# Patient Record
Sex: FEMALE | Race: WHITE
[De-identification: names, ages, dates, MRNs, and addresses within clinical notes are randomized per-mention and may not be internally consistent; named-entity substitution may affect disease eponyms.]

---

## 2021-01-22 ENCOUNTER — HOSPITAL ENCOUNTER (EMERGENCY)
Dept: HOSPITAL 76 - ED | Age: 31
Discharge: HOME | End: 2021-01-22
Payer: MEDICAID

## 2021-01-22 VITALS — DIASTOLIC BLOOD PRESSURE: 82 MMHG | SYSTOLIC BLOOD PRESSURE: 112 MMHG

## 2021-01-22 DIAGNOSIS — N76.0: Primary | ICD-10-CM

## 2021-01-22 DIAGNOSIS — L29.9: ICD-10-CM

## 2021-01-22 LAB
ALBUMIN DIAFP-MCNC: 4.5 G/DL (ref 3.2–5.5)
ALBUMIN/GLOB SERPL: 1.2 {RATIO} (ref 1–2.2)
ALP SERPL-CCNC: 55 IU/L (ref 42–121)
ALT SERPL W P-5'-P-CCNC: 72 IU/L (ref 10–60)
ANION GAP SERPL CALCULATED.4IONS-SCNC: 9 MMOL/L (ref 6–13)
AST SERPL W P-5'-P-CCNC: 48 IU/L (ref 10–42)
BASOPHILS NFR BLD AUTO: 0 10^3/UL (ref 0–0.1)
BASOPHILS NFR BLD AUTO: 0.5 %
BILIRUB BLD-MCNC: 0.8 MG/DL (ref 0.2–1)
BUN SERPL-MCNC: 16 MG/DL (ref 6–20)
C KRUSEI DNA VAG QL NAA+PROBE: NEGATIVE
CALCIUM UR-MCNC: 9.4 MG/DL (ref 8.5–10.3)
CANDIDA DNA VAG QL NAA+PROBE: NEGATIVE
CHLORIDE SERPL-SCNC: 101 MMOL/L (ref 101–111)
CLARITY UR REFRACT.AUTO: CLEAR
CO2 SERPL-SCNC: 25 MMOL/L (ref 21–32)
CREAT SERPLBLD-SCNC: 0.6 MG/DL (ref 0.4–1)
EOSINOPHIL # BLD AUTO: 0.5 10^3/UL (ref 0–0.7)
EOSINOPHIL NFR BLD AUTO: 9.1 %
ERYTHROCYTE [DISTWIDTH] IN BLOOD BY AUTOMATED COUNT: 12.6 % (ref 12–15)
GLOBULIN SER-MCNC: 3.8 G/DL (ref 2.1–4.2)
GLUCOSE SERPL-MCNC: 94 MG/DL (ref 70–100)
GLUCOSE UR QL STRIP.AUTO: NEGATIVE MG/DL
HCG UR QL: NEGATIVE
HGB UR QL STRIP: 14.3 G/DL (ref 12–16)
KETONES UR QL STRIP.AUTO: NEGATIVE MG/DL
LIPASE SERPL-CCNC: 26 U/L (ref 22–51)
LYMPHOCYTES # SPEC AUTO: 1.9 10^3/UL (ref 1.5–3.5)
LYMPHOCYTES NFR BLD AUTO: 32.5 %
MCH RBC QN AUTO: 29.5 PG (ref 27–31)
MCHC RBC AUTO-ENTMCNC: 34.1 G/DL (ref 32–36)
MCV RBC AUTO: 86.6 FL (ref 81–99)
MONOCYTES # BLD AUTO: 0.4 10^3/UL (ref 0–1)
MONOCYTES NFR BLD AUTO: 6.4 %
NEUTROPHILS # BLD AUTO: 3.1 10^3/UL (ref 1.5–6.6)
NEUTROPHILS # SNV AUTO: 5.9 X10^3/UL (ref 4.8–10.8)
NEUTROPHILS NFR BLD AUTO: 51.5 %
NITRITE UR QL STRIP.AUTO: NEGATIVE
PDW BLD AUTO: 9.1 FL (ref 7.9–10.8)
PH UR STRIP.AUTO: 6 PH (ref 5–7.5)
PLATELET # BLD: 159 10^3/UL (ref 130–450)
PROT SPEC-MCNC: 8.3 G/DL (ref 6.7–8.2)
PROT UR STRIP.AUTO-MCNC: NEGATIVE MG/DL
RBC # UR STRIP.AUTO: NEGATIVE /UL
RBC MAR: 4.84 10^6/UL (ref 4.2–5.4)
SP GR UR STRIP.AUTO: >=1.03 (ref 1–1.03)
T VAGINALIS RRNA GENITAL QL PROBE: NEGATIVE
T VAGINALIS RRNA GENITAL QL PROBE: NEGATIVE
UROBILINOGEN UR QL STRIP.AUTO: (no result) E.U./DL
UROBILINOGEN UR STRIP.AUTO-MCNC: NEGATIVE MG/DL

## 2021-01-22 PROCEDURE — 80053 COMPREHEN METABOLIC PANEL: CPT

## 2021-01-22 PROCEDURE — 87481 CANDIDA DNA AMP PROBE: CPT

## 2021-01-22 PROCEDURE — 85025 COMPLETE CBC W/AUTO DIFF WBC: CPT

## 2021-01-22 PROCEDURE — 99283 EMERGENCY DEPT VISIT LOW MDM: CPT

## 2021-01-22 PROCEDURE — 36415 COLL VENOUS BLD VENIPUNCTURE: CPT

## 2021-01-22 PROCEDURE — 87801 DETECT AGNT MULT DNA AMPLI: CPT

## 2021-01-22 PROCEDURE — 87591 N.GONORRHOEAE DNA AMP PROB: CPT

## 2021-01-22 PROCEDURE — 83690 ASSAY OF LIPASE: CPT

## 2021-01-22 PROCEDURE — 81001 URINALYSIS AUTO W/SCOPE: CPT

## 2021-01-22 PROCEDURE — 81003 URINALYSIS AUTO W/O SCOPE: CPT

## 2021-01-22 PROCEDURE — 87491 CHLMYD TRACH DNA AMP PROBE: CPT

## 2021-01-22 PROCEDURE — 81025 URINE PREGNANCY TEST: CPT

## 2021-01-22 PROCEDURE — 87086 URINE CULTURE/COLONY COUNT: CPT

## 2021-01-22 PROCEDURE — 87661 TRICHOMONAS VAGINALIS AMPLIF: CPT

## 2021-01-22 NOTE — ED PHYSICIAN DOCUMENTATION
PD HPI FEMALE 





- Stated complaint


Stated Complaint: FEMALE 





- Chief complaint


Chief Complaint: Abd Pain





- History obtained from


History obtained from: Patient





- History of Present Illness


Timing - onset: How many days ago (2)


Timing - duration: Days (2)


Timing - details: Gradual onset, Still present


Associated symptoms: Abdominal pain, Pelvic pain, Vaginal discharge


Contributing factors: Sexually active.  No: Pregnant, Exposed to STD


Similar symptoms before: Has not had sx before


Recently seen: Clinic (drug treatment clinic and started Suboxone and Zoloft 

about a month ago.)





Review of Systems


Constitutional: denies: Fever, Chills


Throat: denies: Sore throat


Respiratory: denies: Dyspnea, Cough


GI: reports: Abdominal Pain.  denies: Nausea, Vomiting, Diarrhea


: reports: Discharge.  denies: Dysuria, Frequency


Skin: reports: Other (she is having some areas of itching on arms and legs, and 

some spotty rash today. Had not taken any meds prior to the onset of rash.)





PD PAST MEDICAL HISTORY





- Past Medical History


Cardiovascular: None


Respiratory: None


Neuro: None


Endocrine/Autoimmune: None





- Present Medications


Home Medications: 


                                Ambulatory Orders











 Medication  Instructions  Recorded  Confirmed


 


Buprenorphine HCl/Naloxone HCl 1 each PO BID 01/22/21 01/22/21





[Suboxone 8-2 mg Sl tab]   


 


Cetirizine [ZyrTEC] 10 mg PO BID #15 tablet 01/22/21 


 


Fluconazole [Diflucan] 150 mg PO ONCE #1 tablet 01/22/21 


 


Ibuprofen [Motrin] 600 mg PO TID PRN #25 tab 01/22/21 


 


Sertraline HCl [Zoloft] 100 mg PO DAILY 01/22/21 01/22/21


 


dexAMETHasone [Decadron] 4 mg PO DAILY #5 tablet 01/22/21 














- Allergies


Allergies/Adverse Reactions: 


                                    Allergies











Allergy/AdvReac Type Severity Reaction Status Date / Time


 


No Known Drug Allergies Allergy   Verified 01/22/21 15:15














- Social History


Does the pt smoke?: No


Smoking Status: Never smoker





PD ED PE NORMAL





- Vitals


Vital signs reviewed: Yes





- General


General: Alert and oriented X 3, No acute distress, Well developed/nourished





- HEENT


HEENT: Moist mucous membranes, Pharynx benign





- Neck


Neck: Supple, no meningeal sign, No adenopathy





- Cardiac


Cardiac: RRR, No murmur





- Respiratory


Respiratory: Clear bilaterally





- Abdomen


Abdomen: Soft, Non distended, Other (mild tender suprapubic area without 

guarding. )





- Female 


Female : Deferred, Other (she obtained self swabs for STD and Vaginitis. )





- Rectal


Rectal: Deferred





- Back


Back: No CVA TTP





- Derm


Derm: Normal color, Warm and dry, Other (several spotty mac/pap rash spots trunk

 and arms (legs with leggings on). ANd a hive patch appearing area left upper 

arm. )





Results





- Vitals


Vitals: 


                               Vital Signs - 24 hr











  01/22/21 01/22/21





  15:16 17:23


 


Temperature 36.9 C 36.9 C


 


Heart Rate 62 65


 


Respiratory 19 16





Rate  


 


Blood Pressure 120/79 112/82 H


 


O2 Saturation 100 97








                                     Oxygen











O2 Source                      Room air

















- Labs


Labs: 


                                Laboratory Tests











  01/22/21 01/22/21 01/22/21





  15:35 15:35 15:50


 


WBC    5.9


 


RBC    4.84


 


Hgb    14.3


 


Hct    41.9


 


MCV    86.6


 


MCH    29.5


 


MCHC    34.1


 


RDW    12.6


 


Plt Count    159


 


MPV    9.1


 


Neut # (Auto)    3.1


 


Lymph # (Auto)    1.9


 


Mono # (Auto)    0.4


 


Eos # (Auto)    0.5


 


Baso # (Auto)    0.0


 


Absolute Nucleated RBC    0.00


 


Nucleated RBC %    0.0


 


Sodium   


 


Potassium   


 


Chloride   


 


Carbon Dioxide   


 


Anion Gap   


 


BUN   


 


Creatinine   


 


Estimated GFR (MDRD)   


 


Glucose   


 


Calcium   


 


Total Bilirubin   


 


AST   


 


ALT   


 


Alkaline Phosphatase   


 


Total Protein   


 


Albumin   


 


Globulin   


 


Albumin/Globulin Ratio   


 


Lipase   


 


Urine Color   YELLOW 


 


Urine Clarity   CLEAR 


 


Urine pH   6.0 


 


Ur Specific Gravity   >=1.030 H 


 


Urine Protein   NEGATIVE 


 


Urine Glucose (UA)   NEGATIVE 


 


Urine Ketones   NEGATIVE 


 


Urine Occult Blood   NEGATIVE 


 


Urine Nitrite   NEGATIVE 


 


Urine Bilirubin   NEGATIVE 


 


Urine Urobilinogen   0.2 (NORMAL) 


 


Ur Leukocyte Esterase   NEGATIVE 


 


Ur Microscopic Review   NOT INDICATED 


 


Urine Culture Comments   NOT INDICATED 


 


Urine HCG, Qual   NEGATIVE 


 


C. glabrata (PCR)  NEGATIVE  


 


C. krusei (PCR)  NEGATIVE  


 


Candida species DNA  NEGATIVE  


 


T. vaginalis (PCR)  NEGATIVE  


 


Bact Vaginosis (PCR)  POSITIVE A  














  01/22/21





  15:50


 


WBC 


 


RBC 


 


Hgb 


 


Hct 


 


MCV 


 


MCH 


 


MCHC 


 


RDW 


 


Plt Count 


 


MPV 


 


Neut # (Auto) 


 


Lymph # (Auto) 


 


Mono # (Auto) 


 


Eos # (Auto) 


 


Baso # (Auto) 


 


Absolute Nucleated RBC 


 


Nucleated RBC % 


 


Sodium  135


 


Potassium  3.5


 


Chloride  101


 


Carbon Dioxide  25


 


Anion Gap  9.0


 


BUN  16


 


Creatinine  0.6


 


Estimated GFR (MDRD)  119


 


Glucose  94


 


Calcium  9.4


 


Total Bilirubin  0.8


 


AST  48 H


 


ALT  72 H


 


Alkaline Phosphatase  55


 


Total Protein  8.3 H


 


Albumin  4.5


 


Globulin  3.8


 


Albumin/Globulin Ratio  1.2


 


Lipase  26


 


Urine Color 


 


Urine Clarity 


 


Urine pH 


 


Ur Specific Gravity 


 


Urine Protein 


 


Urine Glucose (UA) 


 


Urine Ketones 


 


Urine Occult Blood 


 


Urine Nitrite 


 


Urine Bilirubin 


 


Urine Urobilinogen 


 


Ur Leukocyte Esterase 


 


Ur Microscopic Review 


 


Urine Culture Comments 


 


Urine HCG, Qual 


 


C. glabrata (PCR) 


 


C. krusei (PCR) 


 


Candida species DNA 


 


T. vaginalis (PCR) 


 


Bact Vaginosis (PCR) 














PD MEDICAL DECISION MAKING





- ED course


Complexity details: considered differential (vaginal/pelvic symptoms could be 

yeast or BV. Self swabs obtained. SHe would like to await results to decide on 

antibiotics. Rash is unclear if related. Less likely med reaction since current 

meds have been for over a month. ), d/w patient





Departure





- Departure


Disposition: 01 Home, Self Care


Clinical Impression: 


 Pelvic pain, Pruritic rash





Vaginitis


Qualifiers:


 Chronicity: acute Qualified Code(s): N76.0 - Acute vaginitis





Condition: Stable


Record reviewed to determine appropriate education?: Yes


Prescriptions: 


dexAMETHasone [Decadron] 4 mg PO DAILY #5 tablet


Fluconazole [Diflucan] 150 mg PO ONCE #1 tablet


Ibuprofen [Motrin] 600 mg PO TID PRN #25 tab


 PRN Reason: Pain


Cetirizine [ZyrTEC] 10 mg PO BID #15 tablet


Comments: 


Regarding the rash, this may be an allergic reaction or may relate to the 

current vaginal symptoms.  We can treated as possible allergy with Decadron 

steroid and cetirizine antihistamines over the next several days.





The "cultures" from the vaginal swabs will result tomorrow and we can decide if 

there needs to be any antibiotics. We jose call you if these need to be added.  

Meanwhile we can presume possibly some vaginal yeast infection.  This was 

treated with a dose of an antifungal here and you want to repeat that with 

another dose in 3 days.





Treat the pelvic pain with ibuprofen 2-3 times a day and add Tylenol if needed.


Discharge Date/Time: 01/22/21 17:28